# Patient Record
Sex: MALE | Race: WHITE
[De-identification: names, ages, dates, MRNs, and addresses within clinical notes are randomized per-mention and may not be internally consistent; named-entity substitution may affect disease eponyms.]

---

## 2019-01-16 ENCOUNTER — HOSPITAL ENCOUNTER (EMERGENCY)
Dept: HOSPITAL 25 - ED | Age: 44
Discharge: HOME | End: 2019-01-16
Payer: COMMERCIAL

## 2019-01-16 VITALS — SYSTOLIC BLOOD PRESSURE: 121 MMHG | DIASTOLIC BLOOD PRESSURE: 73 MMHG
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
## 2019-01-16 DIAGNOSIS — S61.412A: Primary | ICD-10-CM
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
## 2019-01-16 PROCEDURE — 12001 RPR S/N/AX/GEN/TRNK 2.5CM/<: CPT

## 2019-01-16 PROCEDURE — 99282 EMERGENCY DEPT VISIT SF MDM: CPT

## 2019-01-16 PROCEDURE — 90471 IMMUNIZATION ADMIN: CPT

## 2019-01-16 PROCEDURE — 90715 TDAP VACCINE 7 YRS/> IM: CPT

## 2019-01-16 NOTE — ED
Upper Extremity Pain





- HPI Summary


HPI Summary: 


Right-hand-dominant patient presents with left hand injury prior to arrival.  

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 

hand when it suddenly released, accidentally striking the back of his hand on  

the edge of a metal electrical box.  He denies numbness or tingling but has 

pain with extension of his left ring finger and may have some weakness here.  

His bleeding is mostly controlled.  He is unsure of his last tetanus and is 

open to receiving a booster today.  Reports pain is 5 out of 10 at present and 

does not want anything for pain at this point in time.





- History of Current Complaint


Chief Complaint: EDLacSutureRecheck


Stated Complaint: LAC ON HAND


Time Seen by Provider: 01/16/19 11:20


Hx Obtained From: Patient





- Allergies/Home Medications


Allergies/Adverse Reactions: 


 Allergies











Allergy/AdvReac Type Severity Reaction Status Date / Time


 


No Known Allergies Allergy   Verified 01/16/19 11:23














PMH/Surg Hx/FS Hx/Imm Hx


Previously Healthy: Yes


Endocrine/Hematology History: 


   Denies: Hx Anticoagulant Therapy, Hx Blood Disorders, Hx Unexplained Bleeding

, Autoimmune Disease





- Immunization History


Immunizations Up to Date: Unable to Obtain/Confirm


Infectious Disease History: No


Infectious Disease History: 


   Denies: Hx of Known/Suspected MRSA, Traveled Outside the [place] in Last 30 Days





- Social History


Occupation: Employed Full-time


Alcohol Use: Rare


Hx Substance Use: No


Substance Use Type: Reports: None


Hx Tobacco Use: No


Smoking Status (MU): Never Smoked Tobacco





Review of Systems


Positive: no symptoms reported


Positive: Arthralgia - hurts to move Left , Decreased ROM


Skin: Other - abrasion and laceration


Positive: Weakness - ?.  Negative: Paresthesia, Numbness


Psychological: Normal


All Other Systems Reviewed And Are Negative: Yes





Physical Exam


Triage Information Reviewed: Yes


Vital Signs On Initial Exam: 


 Initial Vitals











Temp Pulse Resp BP Pulse Ox


 


 97.0 F   68   16   123/83   98 


 


 01/16/19 11:19  01/16/19 11:19  01/16/19 11:19  01/16/19 11:19  01/16/19 11:19











Vital Signs Reviewed: Yes


Appearance: Positive: Well-Appearing, Well-Nourished, Pain Distress - mild


Skin: Positive: Warm, Skin Color Reflects Adequate Perfusion - lac over dorsal 

aspect of Left hand - bleeding well controlled - appears deep - no debris 

observed, possible tendon injury


Head/Face: Positive: Normal Head/Face Inspection


Eyes: Positive: EOMI


ENT: Positive: Hearing grossly normal


Respiratory/Lung Sounds: Positive: Breath Sounds Present


Cardiovascular: Positive: Pulses are Symmetrical in both Upper and Lower 

Extremities


Musculoskeletal: Positive: Strength/ROM Intact - all other phalanges and wrist, 

Limited @ - Left ring finger - pain w/ extension and popped w/ resistance, Pain 

@ - w/ moving ring finger into extension


Neurological: Positive: Normal, Alert, Oriented to Person Place, Time, CN 

Intact II-III.  Negative: Sensory/Motor Intact


Psychiatric: Positive: Normal





Procedures





- Splinting


  ** Left Upper Extremity


Location: Left UE


Hand-Made Type: fiberglass


Splint: volar


Pre-Proc Neuro Vasc Exam: normal


Post-Proc Neuro Vasc Exam: normal





- Laceration/Wound Repair


  ** 1


Location: upper extremity - Left dorsal hand


Description: Linear


Anesthesia: Local, 1.0%, Lido


Length, Depth and Shape: 2cm x 4mm


Betadine Prep?: No - soaked in hibaclens with water solution


Irrigated w/ Saline (ccs): 200 - sterile saline


Laceration/Wound Explored: clean


Closure: Single Layer


Suture Type: Prolene - 5-0


Number of Sutures: 3


Layer Closure?: No


Sterile Dressing Applied?: Yes - xerform + gauze + volar splint - pt tolerated 

well





Diagnostics





- Vital Signs


 Vital Signs











  Temp Pulse Resp BP Pulse Ox


 


 01/16/19 11:19  97.0 F  68  16  123/83  98














- Laboratory


Lab Statement: Any lab studies that have been ordered have been reviewed, and 

results considered in the medical decision making process.





Re-Evaluation





- Re-Evaluation


  ** First Eval


Change: Improved - pt reports improved pain w/ closure





Course/Dx





- Course


Course Of Treatment: XR: no fx, no FB.  High clinical suspicion of Left ring 

finger extensor tendon rupture w/ overlying laceration. Spoke w/ Dr. Tracy who 

advised irrigation, closure, splint and close f/u today. Pt agrees w/ plan and 

will call for appt today and go to office for eval.





- Diagnoses


Provider Diagnoses: 


 Laceration of left hand involving tendon








Discharge





- Sign-Out/Discharge


Documenting (check all that apply): Patient Departure





- Discharge Plan


Condition: Stable


Disposition: HOME


Prescriptions: 


Cephalexin CAP* [Keflex CAP*] 500 mg PO QID #20 cap


Patient Education Materials:  Tendon Rupture (ED)


Forms:  *Work Release


Referrals: 


Crystal Tracy MD [Medical Doctor] - 


Additional Instructions: 


Keep splint clean, dry and in place until seen by orthopedics/hand specialist 

later today. They will re-evaluate your injury and discuss a follow-up plan to 

repair tendon. Call now to schedule appointment today.





In the meantime, rest, elevate and ice to reduce pain/swelling.





*If you develop numbness, tingling, weakness, swelling or skin discoloration, 

loosen ACE wrap and elevate arm for 20 minutes. If symptoms persist, call 

orthopedics or return to ED





- Billing Disposition and Condition


Condition: STABLE


Disposition: Home

## 2019-01-17 NOTE — HP
PREOPERATIVE HISTORY AND PHYSICAL:

 

DATE OF SURGERY/ADMISSION:  01/18/19 [facility]

 

DATE OF OFFICE VISIT/ENCOUNTER:  01/16/19

 

ATTENDING SURGEON:  Crystal Tracy MD * (DICTATED BY LUCINDA STARK)

 

PROCEDURE:  Extensor tendon repair, left long finger.

 

CHIEF COMPLAINT:  Left long finger extensor tendon laceration.

 

HISTORY OF PRESENT ILLNESS:  This is a 43-year-old  who was working at 
[facility] today.  He was pulling a pipe out of a wall and he hit 
the dorsum of his hand on an electrical box and ended up cutting through the 
skin and the extensor tendon of the left long finger.  He was seen in the 
emergency room, was unable to fully extend his middle finger and when he tried 
to resist extending the middle finger as he was examined, he felt a pop.  The 
wound was washed and sutured and he was referred to Dr. Tracy for further 
evaluation and treatment considerations.  The patient reports he feels a little 
bit of numbness on the back of his hand, this is his nondominant hand.  After 
evaluation by Dr. Tracy, it is apparent that he has lacerated the extensor 
tendon of the left long finger and he has consented to proceed with surgical 
repair.

 

PAST MEDICAL HISTORY:  Unremarkable.

 

PAST SURGICAL HISTORY:  Right ACL reconstruction.

 

CURRENT MEDICATIONS:  None.

 

ALLERGIES:  No known drug allergies.

 

FAMILY MEDICAL HISTORY:  Noncontributory.

 

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 
heating.  He denies tobacco use and recreational drug use.  He drinks alcohol 
on occasion.

 

REVIEW OF SYSTEMS:  Negative for general, cephalic, cardiovascular, respiratory
, GI, , other musculoskeletal.  Integumentary is positive for laceration on 
the dorsum of left hand.  Negative endocrine, neurologic, and hematologic 
symptoms. Infectious disease is negative for history of MRSA, hepatitis C, HIV.

 

                               PHYSICAL EXAMINATION

 

GENERAL:  Well-developed, well-nourished 43-year-old male in no acute distress.

 

VITAL SIGNS:  Height 5 feet 9-1/2 inches, weight 210 pounds.  Pulse rate 72, 
blood pressure 120/84.

 

HEENT:  Normocephalic and atraumatic.  Pupils are equal, round, and reactive to 
light and accommodation.  Extraocular movements are intact.

 

NECK:  Supple.  No palpable lymph nodes.  Throat is clear.

 

PULMONARY:  Lungs are clear to auscultation bilaterally.  No wheezes, rales, or 
rhonchi.

 

CARDIOVASCULAR:  Regular rate and rhythm.  S1 and S2.  No murmurs, rubs, or 
gallops.  No edema.

 

ABDOMEN:  Positive bowel sounds, soft, nontender.

 

NEUROLOGIC:  Alert and oriented x3.  Cranial nerves II through XII are intact.

 

MUSCULOSKELETAL:  On exam of his left hand, he has a laceration just proximal 
to the MP joint at the middle finger.  He cannot extend his finger against 
resistance and he has an extensor lag of about 30 degrees.  He can make a fist 
and neurovascular function is intact.

 

 IMPRESSION:  Extensor tendon laceration, left middle finger.

 

PLAN/RECOMMENDATIONS:  The patient is scheduled to undergo an extensor tendon 
repair, left long finger with 

Dr. Tracy on 01/18/19.  He will return to the office 10 days postop for 
followup and suture removal.  A prescription for Norco was e- scribed to the 
patient's pharmacy for postoperative pain management.

 

 ____________________________________ LUCINDA STARK

 

[id number]/CPS #: [id number]

COMFORT

## 2019-01-18 ENCOUNTER — HOSPITAL ENCOUNTER (OUTPATIENT)
Dept: HOSPITAL 25 - OREAST | Age: 44
Discharge: HOME | End: 2019-01-18
Attending: ORTHOPAEDIC SURGERY
Payer: COMMERCIAL

## 2019-01-18 VITALS — DIASTOLIC BLOOD PRESSURE: 73 MMHG | SYSTOLIC BLOOD PRESSURE: 95 MMHG

## 2019-01-18 DIAGNOSIS — S66.323A: Primary | ICD-10-CM
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
## 2019-01-18 NOTE — OP
DATE OF OPERATION:  01/18/19 [facility]

 

DATE OF BIRTH:  [birth year]

 

SURGEON:  Crystal Tracy MD

 

ASSISTANT:  LUCINDA Santa

 

ANESTHESIA:  Local MAC.

 

PRE-OP DIAGNOSIS:  Left long finger extensor tendon laceration.

 

POST-OP DIAGNOSIS:  Left long finger extensor tendon laceration.

 

OPERATIVE PROCEDURE:  Left long finger extensor tendon repair.

 

ESTIMATED BLOOD LOSS:  Zero.

 

TOURNIQUET TIME:  Approximately 15 minutes.

 

INDICATIONS FOR PROCEDURE:  Kishore is a 43-year-old man who was working and he 
suffered a laceration on the dorsal aspect of his left hand over the middle 
finger. Since the injury, he cannot extend the middle finger.  He presents for 
extensor tendon repair.

 

DESCRIPTION OF PROCEDURE:  The patient was brought to the operating room, was 
given a sedation anesthetic, and a local infiltration of 10 cc of 1% plain 
lidocaine on the dorsal aspect of his left hand.  The skin of his left hand and 
forearm was prepped and draped in the usual sterile fashion.  The hand and 
forearm were exsanguinated and the tourniquet elevated to 250 mmHg.  The 
sutures were removed and the wound was explored.  There was a complete 
laceration of the extensor tendon at about the mid metacarpal level.  The wound 
was copiously irrigated with saline and then the tendon was reapproximated with 
4-0 nylon suture in interrupted fashion.  The wound was again irrigated and the 
skin edges were reapproximated with 4-0 nylon suture.  The wound was dressed 
with Xeroform, 4x4, Webril, and a volar splint.  The patient tolerated the 
procedure well, was brought to the recovery room in good condition.

 

 [id number]/CPS #: [id number]

MTDD